# Patient Record
Sex: FEMALE | Race: BLACK OR AFRICAN AMERICAN | Employment: UNEMPLOYED | URBAN - METROPOLITAN AREA
[De-identification: names, ages, dates, MRNs, and addresses within clinical notes are randomized per-mention and may not be internally consistent; named-entity substitution may affect disease eponyms.]

---

## 2019-11-12 ENCOUNTER — HOSPITAL ENCOUNTER (EMERGENCY)
Age: 34
Discharge: PSYCHIATRIC OR SUBSTANCE USE FACILITY | End: 2019-11-13
Attending: EMERGENCY MEDICINE
Payer: MEDICAID

## 2019-11-12 DIAGNOSIS — F32.A DEPRESSION, UNSPECIFIED DEPRESSION TYPE: ICD-10-CM

## 2019-11-12 DIAGNOSIS — T50.902A INTENTIONAL DRUG OVERDOSE, INITIAL ENCOUNTER (HCC): Primary | ICD-10-CM

## 2019-11-12 LAB
ALBUMIN SERPL-MCNC: 3.7 G/DL (ref 3.4–5)
ALBUMIN/GLOB SERPL: 0.9 {RATIO} (ref 0.8–1.7)
ALP SERPL-CCNC: 49 U/L (ref 45–117)
ALT SERPL-CCNC: 72 U/L (ref 13–56)
AMPHET UR QL SCN: NEGATIVE
ANION GAP SERPL CALC-SCNC: 5 MMOL/L (ref 3–18)
APAP SERPL-MCNC: <2 UG/ML (ref 10–30)
AST SERPL-CCNC: 27 U/L (ref 10–38)
ATRIAL RATE: 56 BPM
BARBITURATES UR QL SCN: NEGATIVE
BASOPHILS # BLD: 0 K/UL (ref 0–0.1)
BASOPHILS NFR BLD: 0 % (ref 0–2)
BENZODIAZ UR QL: NEGATIVE
BILIRUB SERPL-MCNC: 0.4 MG/DL (ref 0.2–1)
BUN SERPL-MCNC: 10 MG/DL (ref 7–18)
BUN/CREAT SERPL: 9 (ref 12–20)
CALCIUM SERPL-MCNC: 9.7 MG/DL (ref 8.5–10.1)
CALCULATED P AXIS, ECG09: 56 DEGREES
CALCULATED R AXIS, ECG10: 35 DEGREES
CALCULATED T AXIS, ECG11: 26 DEGREES
CANNABINOIDS UR QL SCN: POSITIVE
CHLORIDE SERPL-SCNC: 109 MMOL/L (ref 100–111)
CO2 SERPL-SCNC: 26 MMOL/L (ref 21–32)
COCAINE UR QL SCN: POSITIVE
CREAT SERPL-MCNC: 1.15 MG/DL (ref 0.6–1.3)
DIAGNOSIS, 93000: NORMAL
DIFFERENTIAL METHOD BLD: NORMAL
EOSINOPHIL # BLD: 0.1 K/UL (ref 0–0.4)
EOSINOPHIL NFR BLD: 1 % (ref 0–5)
ERYTHROCYTE [DISTWIDTH] IN BLOOD BY AUTOMATED COUNT: 14.3 % (ref 11.6–14.5)
ETHANOL SERPL-MCNC: <3 MG/DL (ref 0–3)
GLOBULIN SER CALC-MCNC: 3.9 G/DL (ref 2–4)
GLUCOSE SERPL-MCNC: 100 MG/DL (ref 74–99)
HCG UR QL: NEGATIVE
HCT VFR BLD AUTO: 39.3 % (ref 35–45)
HDSCOM,HDSCOM: ABNORMAL
HGB BLD-MCNC: 12.7 G/DL (ref 12–16)
LYMPHOCYTES # BLD: 2.2 K/UL (ref 0.9–3.6)
LYMPHOCYTES NFR BLD: 33 % (ref 21–52)
MCH RBC QN AUTO: 30.2 PG (ref 24–34)
MCHC RBC AUTO-ENTMCNC: 32.3 G/DL (ref 31–37)
MCV RBC AUTO: 93.6 FL (ref 74–97)
METHADONE UR QL: NEGATIVE
MONOCYTES # BLD: 0.6 K/UL (ref 0.05–1.2)
MONOCYTES NFR BLD: 9 % (ref 3–10)
NEUTS SEG # BLD: 3.8 K/UL (ref 1.8–8)
NEUTS SEG NFR BLD: 57 % (ref 40–73)
OPIATES UR QL: NEGATIVE
P-R INTERVAL, ECG05: 136 MS
PCP UR QL: NEGATIVE
PLATELET # BLD AUTO: 305 K/UL (ref 135–420)
PMV BLD AUTO: 10.4 FL (ref 9.2–11.8)
POTASSIUM SERPL-SCNC: 3.6 MMOL/L (ref 3.5–5.5)
PROT SERPL-MCNC: 7.6 G/DL (ref 6.4–8.2)
Q-T INTERVAL, ECG07: 514 MS
QRS DURATION, ECG06: 80 MS
QTC CALCULATION (BEZET), ECG08: 496 MS
RBC # BLD AUTO: 4.2 M/UL (ref 4.2–5.3)
SALICYLATES SERPL-MCNC: 3.5 MG/DL (ref 2.8–20)
SODIUM SERPL-SCNC: 140 MMOL/L (ref 136–145)
VENTRICULAR RATE, ECG03: 56 BPM
WBC # BLD AUTO: 6.7 K/UL (ref 4.6–13.2)

## 2019-11-12 PROCEDURE — 81025 URINE PREGNANCY TEST: CPT

## 2019-11-12 PROCEDURE — 80053 COMPREHEN METABOLIC PANEL: CPT

## 2019-11-12 PROCEDURE — 93005 ELECTROCARDIOGRAM TRACING: CPT

## 2019-11-12 PROCEDURE — 80307 DRUG TEST PRSMV CHEM ANLYZR: CPT

## 2019-11-12 PROCEDURE — 85025 COMPLETE CBC W/AUTO DIFF WBC: CPT

## 2019-11-12 PROCEDURE — 99285 EMERGENCY DEPT VISIT HI MDM: CPT

## 2019-11-12 NOTE — PROGRESS NOTES
Referred to [facility] and information given to Efrain. Spoke with Ro from [facility] and states they do have beds. Pt information faxed.

## 2019-11-12 NOTE — ED PROVIDER NOTES
EMERGENCY DEPARTMENT HISTORY AND PHYSICAL EXAM    11:12 AM      Date: 2019  Patient Name: Reg Lewis    History of Presenting Illness     Chief Complaint   Patient presents with    Suicidal         History Provided By: Police    Additional History (Context): Reg Lewis is a 29 y.o. female presents with mother called police and EMS reporting patient with depression and overdose on 40 tablets x 50 mg hydroxyzine in an attempt to kill herself. She reports feeling depressed. No pain abdominal pain or chest pain. History and review of systems limited by patient's cooperation. Arnel Elizondo PCP: Jasmine Bustamante MD    Chief Complaint:   Duration:    Timing:    Location:   Quality:   Severity:   Modifying Factors:   Associated Symptoms:       Current Outpatient Medications   Medication Sig Dispense Refill    HYDROcodone-acetaminophen (NORCO) 5-325 mg per tablet Take 1 Tab by mouth every four (4) hours as needed for Pain. Max Daily Amount: 6 Tabs. 6 Tab 0    traZODone (DESYREL) 100 mg tablet Take 200 mg by mouth nightly. Indications: Insomnia      hydrOXYzine (VISTARIL) 25 mg capsule Take 25 mg by mouth three (3) times daily as needed for Itching. Indications: ANXIETY      FLUoxetine (PROZAC) 10 mg capsule Take  by mouth daily. Take three capsules by mouth daily. Indications: MAJOR DEPRESSIVE DISORDER      risperiDONE (RISPERDAL) 1 mg tablet Take 1 mg by mouth daily.  Indications: DEPRESSION TREATMENT ADJUNCT         Past History     Past Medical History:  Past Medical History:   Diagnosis Date    Anxiety 2015    Chronic allergic rhinitis     Contact dermatitis and other eczema, due to unspecified cause     Depression     H/O open hand wound     Psychiatric disorder     Verbal auditory hallucinations 2014       Past Surgical History:  Past Surgical History:   Procedure Laterality Date    HX  SECTION      HX GYN          HX GYN          HX OTHER SURGICAL left hand       Family History:  Family History   Problem Relation Age of Onset    Psychiatric Disorder Father     Diabetes Father        Social History:  Social History     Tobacco Use    Smoking status: Current Every Day Smoker     Packs/day: 0.25     Years: 15.00     Pack years: 3.75    Smokeless tobacco: Never Used   Substance Use Topics    Alcohol use: Yes     Comment: rarely    Drug use: Yes     Types: Marijuana       Allergies:  No Known Allergies      Review of Systems     Review of Systems      Physical Exam       Patient Vitals for the past 12 hrs:   Temp Pulse Resp BP SpO2   11/12/19 1446 97.3 °F (36.3 °C) 62 18 131/78 98 %   11/12/19 1122 98.5 °F (36.9 °C) 63 18 130/89 99 %   11/12/19 1119 -- -- -- -- 98 %       Physical Exam   Constitutional: She appears well-developed and well-nourished. She appears distressed (Tearful). HENT:   Head: Normocephalic and atraumatic. Eyes: Conjunctivae are normal. No scleral icterus. Neck: Normal range of motion. Neck supple. No JVD present. Cardiovascular: Normal rate, regular rhythm and normal heart sounds. 4 intact extremity pulses   Pulmonary/Chest: Effort normal and breath sounds normal.   Abdominal: Soft. She exhibits no mass. There is no tenderness. Musculoskeletal: Normal range of motion. Lymphadenopathy:     She has no cervical adenopathy. Neurological: She is alert. Skin: Skin is warm and dry. Psychiatric:   Mute tearful uncooperative   Nursing note and vitals reviewed. Diagnostic Study Results   Labs -  Recent Results (from the past 12 hour(s))   CBC WITH AUTOMATED DIFF    Collection Time: 11/12/19 11:07 AM   Result Value Ref Range    WBC 6.7 4.6 - 13.2 K/uL    RBC 4.20 4. 20 - 5.30 M/uL    HGB 12.7 12.0 - 16.0 g/dL    HCT 39.3 35.0 - 45.0 %    MCV 93.6 74.0 - 97.0 FL    MCH 30.2 24.0 - 34.0 PG    MCHC 32.3 31.0 - 37.0 g/dL    RDW 14.3 11.6 - 14.5 %    PLATELET 529 390 - 707 K/uL    MPV 10.4 9.2 - 11.8 FL    NEUTROPHILS 57 40 - 73 %    LYMPHOCYTES 33 21 - 52 %    MONOCYTES 9 3 - 10 %    EOSINOPHILS 1 0 - 5 %    BASOPHILS 0 0 - 2 %    ABS. NEUTROPHILS 3.8 1.8 - 8.0 K/UL    ABS. LYMPHOCYTES 2.2 0.9 - 3.6 K/UL    ABS. MONOCYTES 0.6 0.05 - 1.2 K/UL    ABS. EOSINOPHILS 0.1 0.0 - 0.4 K/UL    ABS. BASOPHILS 0.0 0.0 - 0.1 K/UL    DF AUTOMATED     METABOLIC PANEL, COMPREHENSIVE    Collection Time: 11/12/19 11:07 AM   Result Value Ref Range    Sodium 140 136 - 145 mmol/L    Potassium 3.6 3.5 - 5.5 mmol/L    Chloride 109 100 - 111 mmol/L    CO2 26 21 - 32 mmol/L    Anion gap 5 3.0 - 18 mmol/L    Glucose 100 (H) 74 - 99 mg/dL    BUN 10 7.0 - 18 MG/DL    Creatinine 1.15 0.6 - 1.3 MG/DL    BUN/Creatinine ratio 9 (L) 12 - 20      GFR est AA >60 >60 ml/min/1.73m2    GFR est non-AA 54 (L) >60 ml/min/1.73m2    Calcium 9.7 8.5 - 10.1 MG/DL    Bilirubin, total 0.4 0.2 - 1.0 MG/DL    ALT (SGPT) 72 (H) 13 - 56 U/L    AST (SGOT) 27 10 - 38 U/L    Alk.  phosphatase 49 45 - 117 U/L    Protein, total 7.6 6.4 - 8.2 g/dL    Albumin 3.7 3.4 - 5.0 g/dL    Globulin 3.9 2.0 - 4.0 g/dL    A-G Ratio 0.9 0.8 - 1.7     ETHYL ALCOHOL    Collection Time: 11/12/19 11:07 AM   Result Value Ref Range    ALCOHOL(ETHYL),SERUM <3 0 - 3 MG/DL   SALICYLATE    Collection Time: 11/12/19 11:07 AM   Result Value Ref Range    Salicylate level 3.5 2.8 - 20.0 MG/DL   ACETAMINOPHEN    Collection Time: 11/12/19 11:07 AM   Result Value Ref Range    Acetaminophen level <2 (L) 10.0 - 30.0 ug/mL   HCG URINE, QL    Collection Time: 11/12/19 11:32 AM   Result Value Ref Range    HCG urine, QL NEGATIVE  NEG     DRUG SCREEN, URINE    Collection Time: 11/12/19 11:32 AM   Result Value Ref Range    BENZODIAZEPINES NEGATIVE  NEG      BARBITURATES NEGATIVE  NEG      THC (TH-CANNABINOL) POSITIVE (A) NEG      OPIATES NEGATIVE  NEG      PCP(PHENCYCLIDINE) NEGATIVE  NEG      COCAINE POSITIVE (A) NEG      AMPHETAMINES NEGATIVE  NEG      METHADONE NEGATIVE  NEG      HDSCOM (NOTE)    EKG, 12 LEAD, INITIAL Collection Time: 11/12/19 12:54 PM   Result Value Ref Range    Ventricular Rate 56 BPM    Atrial Rate 56 BPM    P-R Interval 136 ms    QRS Duration 80 ms    Q-T Interval 514 ms    QTC Calculation (Bezet) 496 ms    Calculated P Axis 56 degrees    Calculated R Axis 35 degrees    Calculated T Axis 26 degrees    Diagnosis       Sinus bradycardia  Prolonged QT  Abnormal ECG  When compared with ECG of 20-JAN-2015 09:32,  No significant change was found  Confirmed by Kerri Grace ([id number] [id number]) on 11/12/2019 4:19:46 PM         Radiologic Studies -   No orders to display     No results found. Medications ordered:   Medications - No data to display      Medical Decision Making   Initial Medical Decision Making and DDx:  Overdose in attempt to kill herself. Look for coingestants recreational drug use pregnancy basic labs. Shortly after arrival the patient became violent striking the bed in the bed rails and was placed in handcuffs by police so she is now under ECO     ED Course: Progress Notes, Reevaluation, and Consults:  ED Course as of Nov 12 1929   Tue Nov 12, 2019   1142 Critical care time 35 minutes management of behavioral emergency, patient acutely violent in the ER requiring handcuffs by police. [CB]   7381 At this time patient is ambulatory out of handcuffs to the bathroom and back calm pleasant and cooperative. [CB]   2960 Discussed with poison control discussed the overdose, benign metabolic panel negative alcohol salicylate acetaminophen, they recommend 6 hours from time of ingestion observation so clear between 2 and 4 today. [CB]   0315 Patient is again up and ambulatory and appears in no acute distress. Calm and cooperative. CSB screen to the patient, patient opts for voluntary admission so police ECO is terminated and the patient is no longer in handcuffs.     [CB]   1310 Twelve-lead EKG at 12:54 PM, sinus bradycardia at 56  no acute process otherwise.    [CB]      ED Course User Index  [CB] Asia Hassan MD     7:29 PM no further acute events after her initial outburst.  She is been hemodynamically stable and cooperative. Signed out to Dr. Jaylon Strange at shift change pending placement. I am the first provider for this patient. I reviewed the vital signs, available nursing notes, past medical history, past surgical history, family history and social history. Patient Vitals for the past 12 hrs:   Temp Pulse Resp BP SpO2   11/12/19 1446 97.3 °F (36.3 °C) 62 18 131/78 98 %   11/12/19 1122 98.5 °F (36.9 °C) 63 18 130/89 99 %   11/12/19 1119 -- -- -- -- 98 %       Vital Signs-Reviewed the patient's vital signs. Pulse Oximetry Analysis, Cardiac Monitor, 12 lead ekg:  Telemetry sinus rhythm at 62, oximetry 100% room air. Interpreted by the EP. Records Reviewed: Nursing notes reviewed (Time of Review: 11:12 AM)    Procedures:   Critical Care Time:   Aspirin: (was aspirin given for stroke?)    Diagnosis     Clinical Impression:   1. Intentional drug overdose, initial encounter ([facility] [address]    2. Depression, unspecified depression type        Disposition:       Follow-up Information    None          Patient's Medications   Start Taking    No medications on file   Continue Taking    FLUOXETINE (PROZAC) 10 MG CAPSULE    Take  by mouth daily. Take three capsules by mouth daily. Indications: MAJOR DEPRESSIVE DISORDER    HYDROCODONE-ACETAMINOPHEN (NORCO) 5-325 MG PER TABLET    Take 1 Tab by mouth every four (4) hours as needed for Pain. Max Daily Amount: 6 Tabs. HYDROXYZINE (VISTARIL) 25 MG CAPSULE    Take 25 mg by mouth three (3) times daily as needed for Itching. Indications: ANXIETY    RISPERIDONE (RISPERDAL) 1 MG TABLET    Take 1 mg by mouth daily. Indications: DEPRESSION TREATMENT ADJUNCT    TRAZODONE (DESYREL) 100 MG TABLET    Take 200 mg by mouth nightly. Indications:  Insomnia   These Medications have changed    No medications on file   Stop Taking    No medications on file _______________________________    Notes:    Lanelle Shah, MD using Dragon dictation      _______________________________

## 2019-11-12 NOTE — CONSULTS
Patient was interviewed using live video with the assistance of on-site staff. Patient Location: [facility] ED  Physician Location: [facility]    Telepsychiatry Evaluation  Ulrich, Isai Brown -  1985    ID/CC/HPI:   29year-old female with hx cocaine use disorder and depression presents to the ED BIB police after she overdosed on Vistaril in the setting of continued cocaine abuse. At this time she is calm and cooperative, she states I tried to kill myself and she states her mood has been worse over the last 3-4 weeks with loss of energy/motivation, tearfulness, irritability, insomnia, erratic appetite, hopelessness, and intermittent SI. In addition she c/o vague AH voices without commands. She reports she feels disappointed that she survived the overdose and she continues to c/o SI currently. She denies HI/VI. No dmitriy delusions or thought disorder. Substance Abuse History:   Hx cocaine use disorder    Toxicology/UDS Results:  + cocaine, + cannabis    Psychiatric/Trauma/Self-Harm/Violence History:   Hx anxiety/depression with previous suicide attempt by overdose with associated IP BH around a year ago; previously followed by CSB but has no mental health providers currently;     Psychiatric Medications:   Restoril, Prozac, risperidone, trazodone    Active Medical Problems:   Denies    Family History:   Non-contributory    Psychosocial/Legal History/Stressors/Strengths:   Lives alone/independently; has three children ages 6 5 and 3year old; all three kids are currently in the care and custody of pts mother; has no current legal problems     Access to Firearms:   Denies    Appearance & Attire: gown  Attitude & Behavior: calm and cooperative  Speech:  WNL  Mood / Affect: depressed  Thought Processes: linear  Thought Content: +SI  Perception: +AH  Orientation: grossly oriented  Intellectual Functioning: unknown  Insight & Judgment: poor    Impression & Risk:  29year-old female with unspecified depression and cocaine use disorder s/p suicide attempt; she remains at elevated risk of intentional self-harm;    Recommendations:   1. Upon medical clearance, please refer to voluntary IP BH resources; if she refuses, then she meets criteria for referral to CSB for involuntary detainment  2. Restart pt's usual meds    Thanks for inviting us to participate in the care of this patient.         Adelia Giraldo MD  11/12/19 @ 15:47 ET

## 2019-11-12 NOTE — ED NOTES
Spoke to Toura with poison control and gave her pt's urine pregnancy and EKG results. Stated they will close out her case.

## 2019-11-12 NOTE — ED TRIAGE NOTES
Alert female arrives to ED by EMS after taking approx 40 tablets of 50mg hydroxyzine prior to arrival. Pt states suicidal intent. Pt verbally aggressive at time of arrival to ED staff, flops on bed, states \"I want to die! I want to fucking die! \". Police at pt bedside.

## 2019-11-12 NOTE — PROGRESS NOTES
Pt declined to go to [facility]. Called [facility] and they have beds. Pt information faxed.

## 2019-11-12 NOTE — ED NOTES
Pt has calmed down, accepting of plan of care. Pt is in paper scrubs. Pt allows tx team to work with pt. Pt reports today she was due in court due to landlord/rent/financial issues. Pt concerned she may not be able to afford to live in home. Pt has hx depression, bipolar. Pt took approx 40tabs 50mg hydroxyzine pta. Pt has no complaints currently. Pt is actively seeking psychiatric help. Pt belongings secured in ED.

## 2019-11-13 VITALS
HEIGHT: 62 IN | SYSTOLIC BLOOD PRESSURE: 138 MMHG | BODY MASS INDEX: 46.19 KG/M2 | RESPIRATION RATE: 16 BRPM | DIASTOLIC BLOOD PRESSURE: 89 MMHG | HEART RATE: 68 BPM | OXYGEN SATURATION: 97 % | WEIGHT: 251 LBS | TEMPERATURE: 98.4 F

## 2019-11-13 NOTE — ED NOTES
Patient transported via [address] to [facility]. Patient medical records, EMTALA and personal belongings given to transport personnel.

## 2019-11-13 NOTE — ED NOTES
Pt awake. Pt agrees to contract for her safety while in the ED. Pt provided with dinner tray with plastic utensils. Pt advised that she may not have visitors at this time. Pt verbalized her understanding. Sitter remains at the bedside. Will continue to monitor.

## 2019-11-13 NOTE — ED NOTES
Received SBAR from [facility]. Pt currently sleeping on stretcher. Sitter remains at the bedside to monitor pt for safety. Will continue to monitor pt.

## 2019-11-13 NOTE — ED NOTES
This patient has been recommended for inpatient treatment and is awaiting placement. The ED provider has reviewed the patients history and medications, diet, and treatments and will order as necessary.  The patient will be observed and attended to as their medical needs require and/or policy dictates    Report to [place] rn

## 2020-07-08 ENCOUNTER — APPOINTMENT (OUTPATIENT)
Dept: ULTRASOUND IMAGING | Age: 35
End: 2020-07-08
Attending: EMERGENCY MEDICINE
Payer: MEDICAID

## 2020-07-08 ENCOUNTER — HOSPITAL ENCOUNTER (EMERGENCY)
Age: 35
Discharge: HOME | End: 2020-07-08
Attending: EMERGENCY MEDICINE | Admitting: EMERGENCY MEDICINE
Payer: MEDICAID

## 2020-07-08 VITALS
WEIGHT: 260 LBS | TEMPERATURE: 98.6 F | DIASTOLIC BLOOD PRESSURE: 105 MMHG | HEART RATE: 69 BPM | OXYGEN SATURATION: 100 % | HEIGHT: 62 IN | BODY MASS INDEX: 47.84 KG/M2 | SYSTOLIC BLOOD PRESSURE: 156 MMHG | RESPIRATION RATE: 18 BRPM

## 2020-07-08 DIAGNOSIS — O20.0 THREATENED ABORTION: Primary | ICD-10-CM

## 2020-07-08 LAB
ABO + RH BLD: NORMAL
ALBUMIN SERPL-MCNC: 3.3 G/DL (ref 3.4–5)
ALBUMIN/GLOB SERPL: 0.8 {RATIO} (ref 0.8–1.7)
ALP SERPL-CCNC: 31 U/L (ref 45–117)
ALT SERPL-CCNC: 24 U/L (ref 13–56)
ANION GAP SERPL CALC-SCNC: 10 MMOL/L (ref 3–18)
APPEARANCE UR: ABNORMAL
AST SERPL-CCNC: 30 U/L (ref 10–38)
BACTERIA URNS QL MICRO: ABNORMAL /HPF
BASOPHILS # BLD: 0 K/UL (ref 0–0.1)
BASOPHILS NFR BLD: 0 % (ref 0–2)
BILIRUB SERPL-MCNC: 0.4 MG/DL (ref 0.2–1)
BILIRUB UR QL: NEGATIVE
BUN SERPL-MCNC: 9 MG/DL (ref 7–18)
BUN/CREAT SERPL: 12 (ref 12–20)
CALCIUM SERPL-MCNC: 8.8 MG/DL (ref 8.5–10.1)
CHLORIDE SERPL-SCNC: 103 MMOL/L (ref 100–111)
CO2 SERPL-SCNC: 23 MMOL/L (ref 21–32)
COLOR UR: YELLOW
CREAT SERPL-MCNC: 0.76 MG/DL (ref 0.6–1.3)
DIFFERENTIAL METHOD BLD: ABNORMAL
EOSINOPHIL # BLD: 0 K/UL (ref 0–0.4)
EOSINOPHIL NFR BLD: 0 % (ref 0–5)
EPITH CASTS URNS QL MICRO: ABNORMAL /LPF (ref 0–5)
ERYTHROCYTE [DISTWIDTH] IN BLOOD BY AUTOMATED COUNT: 14.7 % (ref 11.6–14.5)
GLOBULIN SER CALC-MCNC: 4.2 G/DL (ref 2–4)
GLUCOSE SERPL-MCNC: 86 MG/DL (ref 74–99)
GLUCOSE UR STRIP.AUTO-MCNC: NEGATIVE MG/DL
HCG SERPL-ACNC: ABNORMAL MIU/ML (ref 0–10)
HCT VFR BLD AUTO: 37 % (ref 35–45)
HGB BLD-MCNC: 12.5 G/DL (ref 12–16)
HGB UR QL STRIP: ABNORMAL
KETONES UR QL STRIP.AUTO: ABNORMAL MG/DL
LEUKOCYTE ESTERASE UR QL STRIP.AUTO: ABNORMAL
LYMPHOCYTES # BLD: 2.7 K/UL (ref 0.9–3.6)
LYMPHOCYTES NFR BLD: 25 % (ref 21–52)
MCH RBC QN AUTO: 30.9 PG (ref 24–34)
MCHC RBC AUTO-ENTMCNC: 33.8 G/DL (ref 31–37)
MCV RBC AUTO: 91.4 FL (ref 74–97)
MONOCYTES # BLD: 0.7 K/UL (ref 0.05–1.2)
MONOCYTES NFR BLD: 6 % (ref 3–10)
NEUTS SEG # BLD: 7.5 K/UL (ref 1.8–8)
NEUTS SEG NFR BLD: 69 % (ref 40–73)
NITRITE UR QL STRIP.AUTO: NEGATIVE
PH UR STRIP: 5.5 [PH] (ref 5–8)
PLATELET # BLD AUTO: 286 K/UL (ref 135–420)
PMV BLD AUTO: 10.6 FL (ref 9.2–11.8)
POTASSIUM SERPL-SCNC: 4.3 MMOL/L (ref 3.5–5.5)
PROT SERPL-MCNC: 7.5 G/DL (ref 6.4–8.2)
PROT UR STRIP-MCNC: 100 MG/DL
RBC # BLD AUTO: 4.05 M/UL (ref 4.2–5.3)
RBC #/AREA URNS HPF: ABNORMAL /HPF (ref 0–5)
SODIUM SERPL-SCNC: 136 MMOL/L (ref 136–145)
SP GR UR REFRACTOMETRY: 1.02 (ref 1–1.03)
UROBILINOGEN UR QL STRIP.AUTO: 0.2 EU/DL (ref 0.2–1)
WBC # BLD AUTO: 10.9 K/UL (ref 4.6–13.2)
WBC URNS QL MICRO: ABNORMAL /HPF (ref 0–4)

## 2020-07-08 PROCEDURE — 96360 HYDRATION IV INFUSION INIT: CPT

## 2020-07-08 PROCEDURE — 74011250636 HC RX REV CODE- 250/636: Performed by: EMERGENCY MEDICINE

## 2020-07-08 PROCEDURE — 80053 COMPREHEN METABOLIC PANEL: CPT

## 2020-07-08 PROCEDURE — 96361 HYDRATE IV INFUSION ADD-ON: CPT

## 2020-07-08 PROCEDURE — 99283 EMERGENCY DEPT VISIT LOW MDM: CPT

## 2020-07-08 PROCEDURE — 85025 COMPLETE CBC W/AUTO DIFF WBC: CPT

## 2020-07-08 PROCEDURE — 86900 BLOOD TYPING SEROLOGIC ABO: CPT

## 2020-07-08 PROCEDURE — 87491 CHLMYD TRACH DNA AMP PROBE: CPT

## 2020-07-08 PROCEDURE — 84702 CHORIONIC GONADOTROPIN TEST: CPT

## 2020-07-08 PROCEDURE — 76817 TRANSVAGINAL US OBSTETRIC: CPT

## 2020-07-08 PROCEDURE — 81001 URINALYSIS AUTO W/SCOPE: CPT

## 2020-07-08 RX ORDER — NITROFURANTOIN 25; 75 MG/1; MG/1
100 CAPSULE ORAL 2 TIMES DAILY
Qty: 6 CAP | Refills: 0 | Status: SHIPPED | OUTPATIENT
Start: 2020-07-08 | End: 2020-07-11

## 2020-07-08 RX ADMIN — SODIUM CHLORIDE 1000 ML: 900 INJECTION, SOLUTION INTRAVENOUS at 09:19

## 2020-07-08 NOTE — ED PROVIDER NOTES
Date: 7/8/2020  Patient Name: Ethelle Riedel    History of Presenting Illness     Chief Complaint   Patient presents with    Vaginal Bleeding     History Provided By: Patient    HPI/Chief Complaint: (Context):who presents with chief complaint of vaginal bleeding started this morning, constant better now approximately hour and a half  No inciting event last sexual encounter was approximately 3 days ago  No fever no cough no upper respiratory symptoms no chest pain or shortness of breath no abdominal pain no focal arm or leg weakness  Patient says she has no abdominal pain during my examination  The pain has resolved as well it was lower pelvic pain  No radiation of symptoms when it was occurring  No bowel or bladder incontinence  No coronavirus exposure no cough congestion there is no prenatal care currently for this pregnancy    --------  Gastational age: 10 weeks  Vag bleed: 1.5 hrs bright red/clot  abd pain/suprapubic but now resolved. Nausea/vomit X 1  + sex 3 days ago  Hx of 3 c sections    -Patient with SHIMA level 3 arrival  Patient is with vaginal bleeding chief complaint of triage note  Blood pressure is elevated to 156/105  Allergy to amoxicillin penicillin  No medical history  Medical history includes anxiety depression contact dermatitis  Patient social surgical history includes left hand surgery C-sections x3  Social history includes current smoker occasional alcohol and marijuana use as well. Patient's chart review has been done patient has history of intentional drug use in November 2019, suicidal ideation 2015.       PCP: UNKNOWN        Past History     Past Medical History:  Past Medical History:   Diagnosis Date    Anxiety 1/2015    Chronic allergic rhinitis     Contact dermatitis and other eczema, due to unspecified cause     Depression 2001    H/O open hand wound     Psychiatric disorder     Verbal auditory hallucinations 9/2014       Past Surgical History:  Past Surgical History: Procedure Laterality Date    HX  SECTION      HX GYN  2007        HX GYN  2010        HX OTHER SURGICAL      left hand       Family History:  Family History   Problem Relation Age of Onset    Psychiatric Disorder Father     Diabetes Father        Social History:  Social History     Tobacco Use    Smoking status: Current Every Day Smoker     Packs/day: 0.25     Years: 15.00     Pack years: 3.75    Smokeless tobacco: Never Used   Substance Use Topics    Alcohol use: Yes     Comment: rarely    Drug use: Yes     Types: Marijuana       Allergies: Allergies   Allergen Reactions    Amoxicillin Anxiety    Penicillins Anxiety         Review of Systems   Review of Systems   Constitutional: Negative for activity change, fatigue and fever. HENT: Negative for congestion and rhinorrhea. Eyes: Negative for visual disturbance. Respiratory: Negative for shortness of breath. Cardiovascular: Negative for chest pain and palpitations. Gastrointestinal: Positive for abdominal pain, nausea and vomiting. Negative for diarrhea. Genitourinary: Positive for vaginal bleeding. Negative for dysuria and hematuria. Musculoskeletal: Negative for back pain. Skin: Negative for rash. Neurological: Negative for dizziness, weakness and light-headedness. Psychiatric/Behavioral: Negative for agitation. All other systems reviewed and are negative. Physical Exam     Physical Exam  Vitals signs and nursing note reviewed. Constitutional:       General: She is not in acute distress. Appearance: She is well-developed. HENT:      Head: Normocephalic and atraumatic. Right Ear: External ear normal.      Left Ear: External ear normal.      Nose: Nose normal.   Eyes:      General: No scleral icterus. Conjunctiva/sclera: Conjunctivae normal.      Pupils: Pupils are equal, round, and reactive to light. Neck:      Musculoskeletal: Normal range of motion and neck supple. Thyroid: No thyromegaly. Vascular: No JVD. Trachea: No tracheal deviation. Cardiovascular:      Rate and Rhythm: Normal rate and regular rhythm. Heart sounds: No murmur. No friction rub. Pulmonary:      Effort: Pulmonary effort is normal.      Breath sounds: Normal breath sounds. No stridor. Chest:      Chest wall: No tenderness. Abdominal:      General: Bowel sounds are normal. There is no distension. Palpations: Abdomen is soft. There is no mass. Tenderness: There is no abdominal tenderness. There is no guarding or rebound. Hernia: No hernia is present. Comments: No abdominal tenderness in the emergency department. No complaints in the ED currently. Benign exam is appreciated of the abdomen     Musculoskeletal: Normal range of motion. General: No tenderness. Lymphadenopathy:      Cervical: No cervical adenopathy. Skin:     General: Skin is warm and dry. Capillary Refill: Capillary refill takes less than 2 seconds. Neurological:      General: No focal deficit present. Mental Status: She is alert. Cranial Nerves: No cranial nerve deficit. Coordination: Coordination normal.   Psychiatric:         Mood and Affect: Mood normal.         Behavior: Behavior normal.         Thought Content: Thought content normal.         Judgment: Judgment normal.         Medical Decision Making   I am the first provider for this patient. I reviewed the vital signs, available nursing notes, past medical history, past surgical history, family history and social history.       Provider Notes (Medical Decision Making): Patient presents emergency department with vaginal bleeding no complaints of pain currently no dysuria  I will check patient's type and Rh  Check patient's pregnancy by checking hormone level and then subsequent ultrasound  Pelvic exam be done as well  Currently patient is clear minimal bleeding  I reviewed reevaluate for any anemia as well  Patient with slightly elevated blood pressure on the triage and I will reevaluate that as well    Vital Signs-Reviewed the patient's vital signs. Pulse Oximetry Analysis -100%, room air, normal      Vitals:    20 0706   BP: (!) 156/105   Pulse: 81   Resp: 18   Temp: 98.6 °F (37 °C)   SpO2: 100%   Weight: 117.9 kg (260 lb)   Height: 5' 2\" (1.575 m)       Records Reviewed: Nursing Notes    ED Course:    12:27 PM  Patient no distress patient's ultrasound is nonspecific and has intrauterine pregnancy patient is been made aware of threatened   Patient's urinalysis shows trace leukocytes 0-3 WBCs I will start patient on Keflex for 3 days  Patient needs prenatal vitamins patient needs also follow-up with the obstetrician  Patient agrees with the plan patient did not want any pelvic exam done at this point patient is medically cleared  Patient will follow-up and if any change or worsen she will return to the emergency department she understands there is a follow-up as needed in 48 to 72 hours. Discharge Note:  12:27 PM  The pt is ready for discharge. The pt's signs, symptoms, diagnosis, and discharge instructions have been discussed and pt has conveyed their understanding. The pt is to follow up as recommended or return to ER should their symptoms worsen. Plan has been discussed and pt is in agreement. Diagnostic Study Results     Orders Placed This Encounter    WET PREP     Standing Status:   Standing     Number of Occurrences:   1    US OB TV W DOPPLER     Standing Status:   Standing     Number of Occurrences:   1     Order Specific Question:   Transport     Answer:   Stretcher [5]     Order Specific Question:   Reason for Exam     Answer:   abd pain/vaginal bleeding     Order Specific Question:   Is Patient Pregnant?      Answer:   Unknown    CBC WITH AUTOMATED DIFF     Standing Status:   Standing     Number of Occurrences:   1    METABOLIC PANEL, COMPREHENSIVE     Standing Status:   Standing     Number of Occurrences:   1    URINALYSIS W/ RFLX MICROSCOPIC     Standing Status:   Standing     Number of Occurrences:   1    BETA HCG, QT     Standing Status:   Standing     Number of Occurrences:   1    CHLAMYDIA/NEISSERIA AMPLIFICATION     Standing Status:   Standing     Number of Occurrences:   1     Order Specific Question:   Specimen type/source     Answer:   Vaginal [516]    URINE MICROSCOPIC ONLY     Standing Status:   Standing     Number of Occurrences:   1    PELVIC EXAM SET UP     Standing Status:   Standing     Number of Occurrences:   1    BLOOD TYPE, (ABO+RH)     Standing Status:   Standing     Number of Occurrences:   1    sodium chloride 0.9 % bolus infusion 1,000 mL    pnv w/o calcium-iron fum-fa 29 mg iron- 1 mg chew     Sig: Take 1 Tab by mouth daily for 30 days.      Dispense:  30 Tab     Refill:  0       Labs -     Recent Results (from the past 12 hour(s))   URINALYSIS W/ RFLX MICROSCOPIC    Collection Time: 07/08/20  7:34 AM   Result Value Ref Range    Color YELLOW      Appearance CLOUDY      Specific gravity 1.022 1.005 - 1.030      pH (UA) 5.5 5.0 - 8.0      Protein 100 (A) NEG mg/dL    Glucose Negative NEG mg/dL    Ketone TRACE (A) NEG mg/dL    Bilirubin Negative NEG      Blood LARGE (A) NEG      Urobilinogen 0.2 0.2 - 1.0 EU/dL    Nitrites Negative NEG      Leukocyte Esterase TRACE (A) NEG     URINE MICROSCOPIC ONLY    Collection Time: 07/08/20  7:34 AM   Result Value Ref Range    WBC 0 to 3 0 - 4 /hpf    RBC TOO NUMEROUS TO COUNT 0 - 5 /hpf    Epithelial cells 1+ 0 - 5 /lpf    Bacteria 1+ (A) NEG /hpf   CBC WITH AUTOMATED DIFF    Collection Time: 07/08/20  9:18 AM   Result Value Ref Range    WBC 10.9 4.6 - 13.2 K/uL    RBC 4.05 (L) 4.20 - 5.30 M/uL    HGB 12.5 12.0 - 16.0 g/dL    HCT 37.0 35.0 - 45.0 %    MCV 91.4 74.0 - 97.0 FL    MCH 30.9 24.0 - 34.0 PG    MCHC 33.8 31.0 - 37.0 g/dL    RDW 14.7 (H) 11.6 - 14.5 %    PLATELET 125 384 - 024 K/uL    MPV 10.6 9.2 - 11.8 FL    NEUTROPHILS 69 40 - 73 %    LYMPHOCYTES 25 21 - 52 %    MONOCYTES 6 3 - 10 %    EOSINOPHILS 0 0 - 5 %    BASOPHILS 0 0 - 2 %    ABS. NEUTROPHILS 7.5 1.8 - 8.0 K/UL    ABS. LYMPHOCYTES 2.7 0.9 - 3.6 K/UL    ABS. MONOCYTES 0.7 0.05 - 1.2 K/UL    ABS. EOSINOPHILS 0.0 0.0 - 0.4 K/UL    ABS. BASOPHILS 0.0 0.0 - 0.1 K/UL    DF AUTOMATED     METABOLIC PANEL, COMPREHENSIVE    Collection Time: 20  9:18 AM   Result Value Ref Range    Sodium 136 136 - 145 mmol/L    Potassium 4.3 3.5 - 5.5 mmol/L    Chloride 103 100 - 111 mmol/L    CO2 23 21 - 32 mmol/L    Anion gap 10 3.0 - 18 mmol/L    Glucose 86 74 - 99 mg/dL    BUN 9 7.0 - 18 MG/DL    Creatinine 0.76 0.6 - 1.3 MG/DL    BUN/Creatinine ratio 12 12 - 20      GFR est AA >60 >60 ml/min/1.73m2    GFR est non-AA >60 >60 ml/min/1.73m2    Calcium 8.8 8.5 - 10.1 MG/DL    Bilirubin, total 0.4 0.2 - 1.0 MG/DL    ALT (SGPT) 24 13 - 56 U/L    AST (SGOT) 30 10 - 38 U/L    Alk. phosphatase 31 (L) 45 - 117 U/L    Protein, total 7.5 6.4 - 8.2 g/dL    Albumin 3.3 (L) 3.4 - 5.0 g/dL    Globulin 4.2 (H) 2.0 - 4.0 g/dL    A-G Ratio 0.8 0.8 - 1.7     BLOOD TYPE, (ABO+RH)    Collection Time: 20  9:18 AM   Result Value Ref Range    ABO/Rh(D) A POSITIVE    BETA HCG, QT    Collection Time: 20  9:18 AM   Result Value Ref Range    Beta HCG, QT 43,715 (H) 0 - 10 MIU/ML       Radiologic Studies -   US OB TV W DOPPLER   Final Result   IMPRESSION:   Single intrauterine pregnancy with estimated sonographic gestational age of 7   weeks 1 day. Clinical correlation as well as follow-up obstetric care and   ultrasound is recommended. Small hypoechoic focus in the subchorionic region measuring approximate 1.2 x   0.4 x 0.9 cm possibly small subchorionic hemorrhage. CT Results  (Last 48 hours)    None        CXR Results  (Last 48 hours)    None              Discharge     Clinical Impression:   1.  Threatened         Disposition:  Home    It should be noted that I will be the provider of record for this patient  Altagracia Ramsay MD      Follow-up Information     Follow up With Specialties Details Why [facility] EMERGENCY DEPT Emergency Medicine  If symptoms worsen [address] [place] [address] 51    Jamie Villarreal MD Obstetrics & Gynecology, Gynecology, Obstetrics Call today Follow Up From Emergency Department [address]  [address] [phone]            Current Discharge Medication List      START taking these medications    Details   pnv w/o calcium-iron fum-fa 29 mg iron- 1 mg chew Take 1 Tab by mouth daily for 30 days.   Qty: 30 Tab, Refills: 0

## 2020-07-08 NOTE — ED NOTES
Verbal shift change report given to [facility], RN (oncoming nurse) by Judy Ruiz (offgoing nurse). Report included the following information SBAR, Kardex and ED Summary.

## 2020-07-08 NOTE — ED TRIAGE NOTES
Patient states has not had period since April- this AM  0600 vaginal bleeding with large clot - bleeding continues

## 2020-07-08 NOTE — DISCHARGE INSTRUCTIONS
Patient Education        Threatened Miscarriage: Care Instructions  Your Care Instructions     Some women have light spotting or bleeding during the first 12 weeks of pregnancy. In some cases this is normal. Light spotting or bleeding can also be a sign of a possible loss of the pregnancy. This is called a threatened miscarriage. At this point, the doctor may not be able to tell if your vaginal bleeding is normal or is a sign of a miscarriage. In early pregnancy, things such as stress, exercise, and sex do not cause miscarriage. You may be worried or upset about the possibility of losing your pregnancy. But do not blame yourself. There is no treatment to stop a threatened miscarriage. If you do have a miscarriage, there was nothing you could have done to prevent it. A miscarriage usually means that the pregnancy is not developing normally. The doctor has checked you carefully, but problems can develop later. If you notice any problems or new symptoms, get medical treatment right away. Follow-up care is a key part of your treatment and safety. Be sure to make and go to all appointments, and call your doctor if you are having problems. It's also a good idea to know your test results and keep a list of the medicines you take. How can you care for yourself at home? · If you do have a miscarriage, you will probably have some vaginal bleeding for 1 to 2 weeks. Use pads instead of tampons. · Take acetaminophen (Tylenol) for cramps. Read and follow all instructions on the label. · Do not take two or more pain medicines at the same time unless the doctor told you to. Many pain medicines have acetaminophen, which is Tylenol. Too much acetaminophen (Tylenol) can be harmful. · Do not have sex until your doctor says it is okay. · Get lots of rest over the next several days. · You may do your normal activities if you feel well enough to do them. But do not do any heavy exercise until your doctor says it is okay.   · Eat a balanced diet that is high in iron and vitamin C. Foods rich in iron include red meat, shellfish, eggs, beans, and leafy green vegetables. Foods high in vitamin C include citrus fruits, tomatoes, and broccoli. Talk to your doctor about whether you need to take iron pills or a multivitamin. · Do not drink alcohol or use tobacco or illegal drugs. · Do not smoke. If you need help quitting, talk to your doctor about stop-smoking programs and medicines. These can increase your chances of quitting for good. When should you call for help? [id number] anytime you think you may need emergency care. For example, call if:  · You passed out (lost consciousness). Call your doctor now or seek immediate medical care if:  · You have severe vaginal bleeding. · You are dizzy or lightheaded, or you feel like you may faint. · You have new or worse pain in your belly or pelvis. · You have a fever. · You have vaginal discharge that smells bad. Watch closely for changes in your health, and be sure to contact your doctor if:  · You do not get better as expected. Where can you learn more? Go to http://lewis-onesimo.info/  Enter [id number] in the search box to learn more about \"Threatened Miscarriage: Care Instructions. \"  Current as of: February 11, 2020               Content Version: 12.5  © 1734-0687 [employer]. Care instructions adapted under license by [employer] (which disclaims liability or warranty for this information). If you have questions about a medical condition or this instruction, always ask your healthcare professional. [place] [address] any warranty or liability for your use of this information.

## 2020-07-08 NOTE — ED NOTES
Attempted several times to obtain the pt's BP, but the machine did not work. The refused to have her BP taken again. Discussed with Josy Katz MD.  Patient discharged and given discharge instructions by MD mary ann.  Patient ambulatory from ED. Patient accompanied by self. Removed and shredded pt's blood armband, but pt wanted to keep the patient armband.

## 2020-07-10 LAB
C TRACH RRNA SPEC QL NAA+PROBE: NEGATIVE
N GONORRHOEA RRNA SPEC QL NAA+PROBE: NEGATIVE
SPECIMEN SOURCE: NORMAL

## 2023-10-30 NOTE — ED NOTES
Vitals:  Patient Vitals for the past 12 hrs:   Temp Pulse Resp BP SpO2   11/12/19 1446 97.3 °F (36.3 °C) 62 18 131/78 98 %   11/12/19 1122 98.5 °F (36.9 °C) 63 18 130/89 99 %   11/12/19 1119 -- -- -- -- 98 %         Medications ordered:   Medications - No data to display      Lab findings:  Recent Results (from the past 12 hour(s))   CBC WITH AUTOMATED DIFF    Collection Time: 11/12/19 11:07 AM   Result Value Ref Range    WBC 6.7 4.6 - 13.2 K/uL    RBC 4.20 4. 20 - 5.30 M/uL    HGB 12.7 12.0 - 16.0 g/dL    HCT 39.3 35.0 - 45.0 %    MCV 93.6 74.0 - 97.0 FL    MCH 30.2 24.0 - 34.0 PG    MCHC 32.3 31.0 - 37.0 g/dL    RDW 14.3 11.6 - 14.5 %    PLATELET 102 662 - 473 K/uL    MPV 10.4 9.2 - 11.8 FL    NEUTROPHILS 57 40 - 73 %    LYMPHOCYTES 33 21 - 52 %    MONOCYTES 9 3 - 10 %    EOSINOPHILS 1 0 - 5 %    BASOPHILS 0 0 - 2 %    ABS. NEUTROPHILS 3.8 1.8 - 8.0 K/UL    ABS. LYMPHOCYTES 2.2 0.9 - 3.6 K/UL    ABS. MONOCYTES 0.6 0.05 - 1.2 K/UL    ABS. EOSINOPHILS 0.1 0.0 - 0.4 K/UL    ABS. BASOPHILS 0.0 0.0 - 0.1 K/UL    DF AUTOMATED     METABOLIC PANEL, COMPREHENSIVE    Collection Time: 11/12/19 11:07 AM   Result Value Ref Range    Sodium 140 136 - 145 mmol/L    Potassium 3.6 3.5 - 5.5 mmol/L    Chloride 109 100 - 111 mmol/L    CO2 26 21 - 32 mmol/L    Anion gap 5 3.0 - 18 mmol/L    Glucose 100 (H) 74 - 99 mg/dL    BUN 10 7.0 - 18 MG/DL    Creatinine 1.15 0.6 - 1.3 MG/DL    BUN/Creatinine ratio 9 (L) 12 - 20      GFR est AA >60 >60 ml/min/1.73m2    GFR est non-AA 54 (L) >60 ml/min/1.73m2    Calcium 9.7 8.5 - 10.1 MG/DL    Bilirubin, total 0.4 0.2 - 1.0 MG/DL    ALT (SGPT) 72 (H) 13 - 56 U/L    AST (SGOT) 27 10 - 38 U/L    Alk.  phosphatase 49 45 - 117 U/L    Protein, total 7.6 6.4 - 8.2 g/dL    Albumin 3.7 3.4 - 5.0 g/dL    Globulin 3.9 2.0 - 4.0 g/dL    A-G Ratio 0.9 0.8 - 1.7     ETHYL ALCOHOL    Collection Time: 11/12/19 11:07 AM   Result Value Ref Range    ALCOHOL(ETHYL),SERUM <3 0 - 3 MG/DL   SALICYLATE    Collection Time: 11/12/19 11:07 AM   Result Value Ref Range    Salicylate level 3.5 2.8 - 20.0 MG/DL   ACETAMINOPHEN    Collection Time: 11/12/19 11:07 AM   Result Value Ref Range    Acetaminophen level <2 (L) 10.0 - 30.0 ug/mL   HCG URINE, QL    Collection Time: 11/12/19 11:32 AM   Result Value Ref Range    HCG urine, QL NEGATIVE  NEG     DRUG SCREEN, URINE    Collection Time: 11/12/19 11:32 AM   Result Value Ref Range    BENZODIAZEPINES NEGATIVE  NEG      BARBITURATES NEGATIVE  NEG      THC (TH-CANNABINOL) POSITIVE (A) NEG      OPIATES NEGATIVE  NEG      PCP(PHENCYCLIDINE) NEGATIVE  NEG      COCAINE POSITIVE (A) NEG      AMPHETAMINES NEGATIVE  NEG      METHADONE NEGATIVE  NEG      HDSCOM (NOTE)    EKG, 12 LEAD, INITIAL    Collection Time: 11/12/19 12:54 PM   Result Value Ref Range    Ventricular Rate 56 BPM    Atrial Rate 56 BPM    P-R Interval 136 ms    QRS Duration 80 ms    Q-T Interval 514 ms    QTC Calculation (Bezet) 496 ms    Calculated P Axis 56 degrees    Calculated R Axis 35 degrees    Calculated T Axis 26 degrees    Diagnosis       Sinus bradycardia  Prolonged QT  Abnormal ECG  When compared with ECG of 20-JAN-2015 09:32,  No significant change was found  Confirmed by Vanessa Cramer [phone]) on 11/12/2019 4:19:46 PM         EKG interpretation by ED Physician:      X-Ray, CT or other radiology findings or impressions:  No orders to display       Progress notes, Consult notes or additional Procedure notes:   T/o from dr hassan to f/u on placement  Reevaluation of patient:   Stable for mental health placement    Disposition:  Diagnosis:   1. Intentional drug overdose, initial encounter ([facility] [address]    2. Depression, unspecified depression type        Disposition: transfer    Follow-up Information    None           Patient's Medications   Start Taking    No medications on file   Continue Taking    FLUOXETINE (PROZAC) 10 MG CAPSULE    Take  by mouth daily. Take three capsules by mouth daily.    Indications: MAJOR DEPRESSIVE DISORDER    HYDROCODONE-ACETAMINOPHEN (NORCO) 5-325 MG PER TABLET    Take 1 Tab by mouth every four (4) hours as needed for Pain. Max Daily Amount: 6 Tabs. HYDROXYZINE (VISTARIL) 25 MG CAPSULE    Take 25 mg by mouth three (3) times daily as needed for Itching. Indications: ANXIETY    RISPERIDONE (RISPERDAL) 1 MG TABLET    Take 1 mg by mouth daily. Indications: DEPRESSION TREATMENT ADJUNCT    TRAZODONE (DESYREL) 100 MG TABLET    Take 200 mg by mouth nightly. Indications:  Insomnia   These Medications have changed    No medications on file   Stop Taking    No medications on file Ambulatory